# Patient Record
Sex: FEMALE | URBAN - NONMETROPOLITAN AREA
[De-identification: names, ages, dates, MRNs, and addresses within clinical notes are randomized per-mention and may not be internally consistent; named-entity substitution may affect disease eponyms.]

---

## 2024-04-08 ENCOUNTER — APPOINTMENT (OUTPATIENT)
Dept: CARDIOLOGY | Facility: CLINIC | Age: 49
End: 2024-04-08
Payer: COMMERCIAL

## 2024-04-15 ENCOUNTER — OFFICE VISIT (OUTPATIENT)
Dept: CARDIOLOGY | Facility: CLINIC | Age: 49
End: 2024-04-15
Payer: COMMERCIAL

## 2024-04-15 VITALS
DIASTOLIC BLOOD PRESSURE: 110 MMHG | WEIGHT: 137 LBS | BODY MASS INDEX: 23.39 KG/M2 | HEIGHT: 64 IN | HEART RATE: 92 BPM | SYSTOLIC BLOOD PRESSURE: 154 MMHG

## 2024-04-15 DIAGNOSIS — E87.1 HYPONATREMIA: ICD-10-CM

## 2024-04-15 DIAGNOSIS — R00.2 PALPITATIONS: ICD-10-CM

## 2024-04-15 DIAGNOSIS — E87.6 HYPOKALEMIA: ICD-10-CM

## 2024-04-15 DIAGNOSIS — Z87.891 FORMER SMOKER: ICD-10-CM

## 2024-04-15 DIAGNOSIS — I10 PRIMARY HYPERTENSION: ICD-10-CM

## 2024-04-15 DIAGNOSIS — R42 DIZZINESS: ICD-10-CM

## 2024-04-15 DIAGNOSIS — R00.0 SINUS TACHYCARDIA: ICD-10-CM

## 2024-04-15 PROCEDURE — 3074F SYST BP LT 130 MM HG: CPT | Performed by: INTERNAL MEDICINE

## 2024-04-15 PROCEDURE — 93000 ELECTROCARDIOGRAM COMPLETE: CPT | Performed by: INTERNAL MEDICINE

## 2024-04-15 PROCEDURE — 3079F DIAST BP 80-89 MM HG: CPT | Performed by: INTERNAL MEDICINE

## 2024-04-15 PROCEDURE — 1036F TOBACCO NON-USER: CPT | Performed by: INTERNAL MEDICINE

## 2024-04-15 PROCEDURE — 99204 OFFICE O/P NEW MOD 45 MIN: CPT | Performed by: INTERNAL MEDICINE

## 2024-04-15 RX ORDER — ASCORBIC ACID 125 MG
1 TABLET,CHEWABLE ORAL DAILY
COMMUNITY

## 2024-04-15 RX ORDER — OMEPRAZOLE 40 MG/1
40 CAPSULE, DELAYED RELEASE ORAL
COMMUNITY
Start: 2024-03-06

## 2024-04-15 RX ORDER — OMEGA-3-ACID ETHYL ESTERS 1 G/1
1 CAPSULE, LIQUID FILLED ORAL DAILY
COMMUNITY

## 2024-04-15 RX ORDER — METOPROLOL SUCCINATE 50 MG/1
50 TABLET, EXTENDED RELEASE ORAL DAILY
Qty: 90 TABLET | Refills: 3 | Status: SHIPPED | OUTPATIENT
Start: 2024-04-15 | End: 2024-04-26 | Stop reason: SINTOL

## 2024-04-15 RX ORDER — LANOLIN ALCOHOL/MO/W.PET/CERES
400 CREAM (GRAM) TOPICAL DAILY
Qty: 90 TABLET | Refills: 3 | Status: SHIPPED | OUTPATIENT
Start: 2024-04-15 | End: 2024-04-26 | Stop reason: SINTOL

## 2024-04-15 RX ORDER — METOPROLOL SUCCINATE 25 MG/1
25 TABLET, EXTENDED RELEASE ORAL DAILY
COMMUNITY
Start: 2024-03-11 | End: 2024-04-15 | Stop reason: DRUGHIGH

## 2024-04-15 NOTE — LETTER
April 15, 2024     Luis Escalera DO  [address]  [place] [address]    Patient: Laurel Sapp   YOB: 1975   Date of Visit: 4/15/2024       Dear Dr. Luis Escalera DO:    Thank you for referring Laurel Sapp to me for evaluation. Below are my notes for this consultation.  If you have questions, please do not hesitate to call me. I look forward to following your patient along with you.       Sincerely,     Janna Byrd MD      CC: No Recipients  ______________________________________________________________________________________    Referred by  No ref. provider found for New Patient Visit (Dizzy, palpitations)     History Of Present Illness:    Laurel Sapp is a 48 y.o. female presenting with palpitations dizziness loss of sense of balance, multiple ER visits for a variety of similar complaints, intolerance to several blood pressure medications, hyponatremia and hypokalemia.    She also complains of sharp bilateral upper chest pain.  She says she has not felt well since December 2023.  Initially she felt like she was on too many blood pressure medications.  She is currently only on the metoprolol succinate.  She works in a medical facility.  She has noticed that her heart rate has been elevated into the 100-1 20 range, frequently with any activity sometimes at rest, and on occasions it rises to 140 bpm.  She feels heavy pounding in her chest, and in her head, and at times is lightheaded.  She has also been getting headaches.  Headaches occur particularly when her blood pressure is elevated.  Her appetite has been poor.  She says she recently had strep throat, and since then appetite is poor.  She cut out coffee in December 2023.  She has not passed out.  She has a family history of hypertension.  She denies being pregnant.  Initial blood pressure was 154/110 in the office, repeat blood pressure 124 over 80s, resting heart rate 92 bpm, EKG  "was reviewed.  Blood pressure symmetric in both upper extremities.    Details pertinent pertaining to multiple ER visits was reviewed.  She was in the emergency department yesterday, at which time her sodium was 129 potassium 3.4 glucose 100.  Her lipid profile from December 2023 showed HDL cholesterol 56 LDL cholesterol 127 triglycerides 58 total cholesterol to HDL ratio 3.5.    12 point review of systems is negative or noncontributory except as noted    Past Medical History:  She has no past medical history on file.    Past Surgical History:  She has a past surgical history that includes Hysterectomy; Breast lumpectomy; and Tubal ligation.      Social History:  She reports that she has quit smoking. Her smoking use included cigarettes. She has quit using smokeless tobacco. She reports that she does not currently use alcohol. She reports that she does not use drugs.    Family History:  Family History   Problem Relation Name Age of Onset   • No Known Problems Mother     • Lung cancer Father     • Hypertension Sister     • Hypertension Maternal Grandmother     • Diabetes Maternal Grandmother     • Diabetes Paternal Grandfather          Allergies:  Amlodipine, Chlorthalidone, Amoxicillin, Ciprofloxacin, and Sulfa (sulfonamide antibiotics)    Outpatient Medications:  Current Outpatient Medications   Medication Instructions   • metoprolol succinate XL (TOPROL-XL) 25 mg, oral, Daily   • omega-3 acid ethyl esters (LOVAZA) 1 g, oral, Daily   • omeprazole (PRILOSEC) 40 mg, oral, Daily before breakfast   • red beet root-sour cherry ext 250-0.5 mg tablet,chewable 1 tablet, oral, Daily        Last Recorded Vitals:  Vitals:    04/15/24 1422 04/15/24 1423   BP: 124/86 128/88   BP Location: Left arm Right arm   Patient Position: Sitting Sitting   Pulse: 92    Weight: 62.1 kg (137 lb)    Height: 1.626 m (5' 4\")        Physical Exam:    GENERAL APPEARANCE: Well developed, well nourished, in no acute distress.  CHEST: Symmetric and " non-tender.  INTEGUMENT: Skin warm and dry, without gross excoriationis or lesions.  HEENT: No gross abnormalities, no jugular venous distention no carotid bruit or scleral icterus  NECK: Supple, no JVD, no bruit. Thyroid not palpable. Carotid upstrokes normal.  NEURO/PSHCY: Alert and oriented x3; appropriate behavior and responses and responses, with normal balance and coordination  LUNGS: Clear to auscultation bilaterally; normal respiratory effort.  HEART: Rate and rhythm regular with no evident murmur; no gallop appreciated. There are no rubs, clicks or heaves.   ABDOMEN: Soft, nontender, no masses  or bruits.   MUSCULOSKELETAL: No obvious deformity identified  EXTREMITIES: Warm  There is no edema noted.  PERIPHERAL VASCULAR: Pulses present and equally palpable; 2+ throughout.              Labs reviewed today : Labs from the emergency department to include CBC basic metabolic profile comprehensive profile reviewed.    Assessment/Plan  Diagnoses and all orders for this visit:  Palpitations  -     Follow Up In Cardiology; Future  -     ECG 12 Lead  -     Holter Or Event Cardiac Monitor; Future  -     metoprolol succinate XL (Toprol-XL) 50 mg 24 hr tablet; Take 1 tablet (50 mg) by mouth once daily. Do not crush or chew.  -     magnesium oxide (Mag-Ox) 400 mg (241.3 mg magnesium) tablet; Take 1 tablet (400 mg) by mouth once daily.  -     Transthoracic Echo Complete; Future  Dizziness  -     Holter Or Event Cardiac Monitor; Future  -     Transthoracic Echo Complete; Future  Primary hypertension  -     metoprolol succinate XL (Toprol-XL) 50 mg 24 hr tablet; Take 1 tablet (50 mg) by mouth once daily. Do not crush or chew.  -     Transthoracic Echo Complete; Future  -     Basic Metabolic Panel; Future  -     Thyroxine, Free; Future  -     Thyroid Stimulating Hormone; Future  Hyponatremia  -     Basic Metabolic Panel; Future  -     Thyroxine, Free; Future  -     Thyroid Stimulating Hormone; Future  Hypokalemia  -      Basic Metabolic Panel; Future  -     Thyroxine, Free; Future  -     Thyroid Stimulating Hormone; Future  Sinus tachycardia  -     ECG 12 Lead  -     Holter Or Event Cardiac Monitor; Future  -     Transthoracic Echo Complete; Future  -     Basic Metabolic Panel; Future  -     Thyroxine, Free; Future  -     Thyroid Stimulating Hormone; Future  BMI 23.0-23.9, adult  Former smoker      Clinical decision makin.  Discussed with patient that management of hypertension usually involves trial and error, several medications may need to be tried, she may have or may not tolerate the medications that we try, in which case we will have to switch medications, and close follow-up would be warranted  2.  At this point I would say that she has inappropriate sinus tachycardia.  3.  Her hypokalemia is difficult to explain except if she has been on diuretics.  4.  She was also hyponatremic yesterday, could have been volume depleted, or could have been related to the ACE inhibitor.  We will continue to follow  5.  Will increase the metoprolol succinate from 25 mg daily to 50 mg daily  6.  May need to further uptitrate as needed.  She wants to take it twice daily, so she will increase the dose to 25 mg p.o. twice daily  7.  Echocardiogram in view of her dizziness and her chest pain  8.  48-hour Holter monitor  9.  She is encouraged to increase intake of potassium rich foods  10.  Hydration was encouraged  11.  48-hour Holter monitor  12.  Free T4 and TSH  13.  Basic metabolic profile after 48 hours  14.  At this point there is no clinical suspicion for pulmonary embolism.    Patient to follow-up after testing sooner if interval problems arise  Provider Attestation - Scribe documentation    All medical record entries made by the Scribe were at my direction and personally dictated by me. I have reviewed the chart and agree that the record accurately reflects my personal performance of the history, physical exam, discussion and plan.

## 2024-04-15 NOTE — PROGRESS NOTES
Referred by Dr. Echeverria ref. provider found for New Patient Visit (Dizzy, palpitations)     History Of Present Illness:    Laurel Sapp is a 48 y.o. female presenting with palpitations dizziness loss of sense of balance, multiple ER visits for a variety of similar complaints, intolerance to several blood pressure medications, hyponatremia and hypokalemia.    She also complains of sharp bilateral upper chest pain.  She says she has not felt well since December 2023.  Initially she felt like she was on too many blood pressure medications.  She is currently only on the metoprolol succinate.  She works in a medical facility.  She has noticed that her heart rate has been elevated into the 100-1 20 range, frequently with any activity sometimes at rest, and on occasions it rises to 140 bpm.  She feels heavy pounding in her chest, and in her head, and at times is lightheaded.  She has also been getting headaches.  Headaches occur particularly when her blood pressure is elevated.  Her appetite has been poor.  She says she recently had strep throat, and since then appetite is poor.  She cut out coffee in December 2023.  She has not passed out.  She has a family history of hypertension.  She denies being pregnant.  Initial blood pressure was 154/110 in the office, repeat blood pressure 124 over 80s, resting heart rate 92 bpm, EKG was reviewed.  Blood pressure symmetric in both upper extremities.    Details pertinent pertaining to multiple ER visits was reviewed.  She was in the emergency department yesterday, at which time her sodium was 129 potassium 3.4 glucose 100.  Her lipid profile from December 2023 showed HDL cholesterol 56 LDL cholesterol 127 triglycerides 58 total cholesterol to HDL ratio 3.5.    12 point review of systems is negative or noncontributory except as noted    Past Medical History:  She has no past medical history on file.    Past Surgical History:  She has a past surgical history that includes  "Hysterectomy; Breast lumpectomy; and Tubal ligation.      Social History:  She reports that she has quit smoking. Her smoking use included cigarettes. She has quit using smokeless tobacco. She reports that she does not currently use alcohol. She reports that she does not use drugs.    Family History:  Family History   Problem Relation Name Age of Onset    No Known Problems Mother      Lung cancer Father      Hypertension Sister      Hypertension Maternal Grandmother      Diabetes Maternal Grandmother      Diabetes Paternal Grandfather          Allergies:  Amlodipine, Chlorthalidone, Amoxicillin, Ciprofloxacin, and Sulfa (sulfonamide antibiotics)    Outpatient Medications:  Current Outpatient Medications   Medication Instructions    metoprolol succinate XL (TOPROL-XL) 25 mg, oral, Daily    omega-3 acid ethyl esters (LOVAZA) 1 g, oral, Daily    omeprazole (PRILOSEC) 40 mg, oral, Daily before breakfast    red beet root-sour cherry ext 250-0.5 mg tablet,chewable 1 tablet, oral, Daily        Last Recorded Vitals:  Vitals:    04/15/24 1422 04/15/24 1423   BP: 124/86 128/88   BP Location: Left arm Right arm   Patient Position: Sitting Sitting   Pulse: 92    Weight: 62.1 kg (137 lb)    Height: 1.626 m (5' 4\")        Physical Exam:    GENERAL APPEARANCE: Well developed, well nourished, in no acute distress.  CHEST: Symmetric and non-tender.  INTEGUMENT: Skin warm and dry, without gross excoriationis or lesions.  HEENT: No gross abnormalities, no jugular venous distention no carotid bruit or scleral icterus  NECK: Supple, no JVD, no bruit. Thyroid not palpable. Carotid upstrokes normal.  NEURO/PSHCY: Alert and oriented x3; appropriate behavior and responses and responses, with normal balance and coordination  LUNGS: Clear to auscultation bilaterally; normal respiratory effort.  HEART: Rate and rhythm regular with no evident murmur; no gallop appreciated. There are no rubs, clicks or heaves.   ABDOMEN: Soft, nontender, no " masses  or bruits.   MUSCULOSKELETAL: No obvious deformity identified  EXTREMITIES: Warm  There is no edema noted.  PERIPHERAL VASCULAR: Pulses present and equally palpable; 2+ throughout.              Labs reviewed today : Labs from the emergency department to include CBC basic metabolic profile comprehensive profile reviewed.    Assessment/Plan   Diagnoses and all orders for this visit:  Palpitations  -     Follow Up In Cardiology; Future  -     ECG 12 Lead  -     Holter Or Event Cardiac Monitor; Future  -     metoprolol succinate XL (Toprol-XL) 50 mg 24 hr tablet; Take 1 tablet (50 mg) by mouth once daily. Do not crush or chew.  -     magnesium oxide (Mag-Ox) 400 mg (241.3 mg magnesium) tablet; Take 1 tablet (400 mg) by mouth once daily.  -     Transthoracic Echo Complete; Future  Dizziness  -     Holter Or Event Cardiac Monitor; Future  -     Transthoracic Echo Complete; Future  Primary hypertension  -     metoprolol succinate XL (Toprol-XL) 50 mg 24 hr tablet; Take 1 tablet (50 mg) by mouth once daily. Do not crush or chew.  -     Transthoracic Echo Complete; Future  -     Basic Metabolic Panel; Future  -     Thyroxine, Free; Future  -     Thyroid Stimulating Hormone; Future  Hyponatremia  -     Basic Metabolic Panel; Future  -     Thyroxine, Free; Future  -     Thyroid Stimulating Hormone; Future  Hypokalemia  -     Basic Metabolic Panel; Future  -     Thyroxine, Free; Future  -     Thyroid Stimulating Hormone; Future  Sinus tachycardia  -     ECG 12 Lead  -     Holter Or Event Cardiac Monitor; Future  -     Transthoracic Echo Complete; Future  -     Basic Metabolic Panel; Future  -     Thyroxine, Free; Future  -     Thyroid Stimulating Hormone; Future  BMI 23.0-23.9, adult  Former smoker      Clinical decision makin.  Discussed with patient that management of hypertension usually involves trial and error, several medications may need to be tried, she may have or may not tolerate the medications that we  try, in which case we will have to switch medications, and close follow-up would be warranted  2.  At this point I would say that she has inappropriate sinus tachycardia.  3.  Her hypokalemia is difficult to explain except if she has been on diuretics.  4.  She was also hyponatremic yesterday, could have been volume depleted, or could have been related to the ACE inhibitor.  We will continue to follow  5.  Will increase the metoprolol succinate from 25 mg daily to 50 mg daily  6.  May need to further uptitrate as needed.  She wants to take it twice daily, so she will increase the dose to 25 mg p.o. twice daily  7.  Echocardiogram in view of her dizziness and her chest pain  8.  48-hour Holter monitor  9.  She is encouraged to increase intake of potassium rich foods  10.  Hydration was encouraged  11.  48-hour Holter monitor  12.  Free T4 and TSH  13.  Basic metabolic profile after 48 hours  14.  At this point there is no clinical suspicion for pulmonary embolism.    Patient to follow-up after testing sooner if interval problems arise  Provider Attestation - Scribe documentation    All medical record entries made by the Scribe were at my direction and personally dictated by me. I have reviewed the chart and agree that the record accurately reflects my personal performance of the history, physical exam, discussion and plan.

## 2024-04-23 ENCOUNTER — PATIENT MESSAGE (OUTPATIENT)
Dept: CARDIOLOGY | Facility: CLINIC | Age: 49
End: 2024-04-23
Payer: COMMERCIAL

## 2024-04-23 DIAGNOSIS — R00.2 PALPITATIONS: ICD-10-CM

## 2024-04-23 DIAGNOSIS — I10 PRIMARY HYPERTENSION: ICD-10-CM

## 2024-04-26 ENCOUNTER — ANCILLARY PROCEDURE (OUTPATIENT)
Dept: CARDIOLOGY | Facility: CLINIC | Age: 49
End: 2024-04-26
Payer: COMMERCIAL

## 2024-04-26 DIAGNOSIS — R00.0 SINUS TACHYCARDIA: ICD-10-CM

## 2024-04-26 DIAGNOSIS — R00.2 PALPITATIONS: ICD-10-CM

## 2024-04-26 DIAGNOSIS — R42 DIZZINESS: ICD-10-CM

## 2024-04-26 PROCEDURE — 93272 ECG/REVIEW INTERPRET ONLY: CPT | Performed by: INTERNAL MEDICINE

## 2024-04-26 RX ORDER — METOPROLOL SUCCINATE 25 MG/1
25 TABLET, EXTENDED RELEASE ORAL 2 TIMES DAILY
COMMUNITY
End: 2024-06-10 | Stop reason: SDUPTHER

## 2024-04-26 NOTE — PATIENT COMMUNICATION
"Patient was in office for KOH application report she is taking metoprolol succinate 25mg BID instead to the 50mg at one time. Patient reports does not take consistently, makes her swell and hands itching, Patient reports possible allergy reaction.     Patient is not using magnesium oxide, states made her feel unwell \"weird\"    Patient states she was increasing sodium intake d/t sodium level being low per the patient trying to avoid returning to the hospital.   Reviewed labs in chart from 03/2024, sodium was 136  Potassium 4.0    "

## 2024-05-20 ENCOUNTER — HOSPITAL ENCOUNTER (OUTPATIENT)
Dept: CARDIOLOGY | Facility: CLINIC | Age: 49
Discharge: HOME | End: 2024-05-20
Payer: COMMERCIAL

## 2024-05-20 VITALS — WEIGHT: 137 LBS | HEIGHT: 64 IN | BODY MASS INDEX: 23.39 KG/M2

## 2024-05-20 DIAGNOSIS — R42 DIZZINESS: ICD-10-CM

## 2024-05-20 DIAGNOSIS — R00.2 PALPITATIONS: ICD-10-CM

## 2024-05-20 DIAGNOSIS — R00.0 SINUS TACHYCARDIA: ICD-10-CM

## 2024-05-20 DIAGNOSIS — I10 PRIMARY HYPERTENSION: ICD-10-CM

## 2024-05-20 PROCEDURE — 93306 TTE W/DOPPLER COMPLETE: CPT

## 2024-05-20 PROCEDURE — 93306 TTE W/DOPPLER COMPLETE: CPT | Performed by: INTERNAL MEDICINE

## 2024-05-20 NOTE — PATIENT COMMUNICATION
"Patient in office for her ECHO. Patient has continued complaints of bilateral lower extremity edema. Patient states she has not been increasing her sodium intake, Patient states she wears arjun hose currently, when she does not wear them her legs throb when she walks due to the swelling \"My legs are not normally this size, I can tell the difference\". Patient labs review per Rea Merrill NP while in office and are WNL. Patient advised to continue wearing her KOH. Advised will send complaints to Dr. Janna Byrd MD fore review and see if possible med change needed.   "

## 2024-05-21 ENCOUNTER — OFFICE VISIT (OUTPATIENT)
Age: 49
End: 2024-05-21
Payer: COMMERCIAL

## 2024-05-21 VITALS
SYSTOLIC BLOOD PRESSURE: 127 MMHG | BODY MASS INDEX: 23.56 KG/M2 | HEIGHT: 64 IN | OXYGEN SATURATION: 99 % | HEART RATE: 84 BPM | DIASTOLIC BLOOD PRESSURE: 91 MMHG | WEIGHT: 138 LBS | RESPIRATION RATE: 18 BRPM

## 2024-05-21 DIAGNOSIS — R09.A2 GLOBUS SENSATION: ICD-10-CM

## 2024-05-21 DIAGNOSIS — R13.10 DYSPHAGIA, UNSPECIFIED TYPE: Primary | ICD-10-CM

## 2024-05-21 LAB
AORTIC VALVE MEAN GRADIENT: 2 MMHG
AORTIC VALVE PEAK VELOCITY: 1.21 M/S
AV PEAK GRADIENT: 5.9 MMHG
AVA (PEAK VEL): 2.15 CM2
AVA (VTI): 2.52 CM2
EJECTION FRACTION APICAL 4 CHAMBER: 64.1
LEFT VENTRICLE INTERNAL DIMENSION DIASTOLE: 4.52 CM (ref 3.5–6)
LEFT VENTRICULAR OUTFLOW TRACT DIAMETER: 1.9 CM
MITRAL VALVE E/A RATIO: 1.69
MITRAL VALVE E/E' RATIO: 6.4

## 2024-05-21 PROCEDURE — 99204 OFFICE O/P NEW MOD 45 MIN: CPT | Performed by: STUDENT IN AN ORGANIZED HEALTH CARE EDUCATION/TRAINING PROGRAM

## 2024-05-21 RX ORDER — OMEPRAZOLE 40 MG/1
40 CAPSULE, DELAYED RELEASE ORAL
COMMUNITY
Start: 2024-03-06 | End: 2024-05-21 | Stop reason: SINTOL

## 2024-05-21 RX ORDER — OMEPRAZOLE 20 MG/1
40 CAPSULE, DELAYED RELEASE ORAL
Qty: 60 CAPSULE | Refills: 0 | Status: SHIPPED | OUTPATIENT
Start: 2024-05-21 | End: 2024-06-20

## 2024-05-21 RX ORDER — METOPROLOL SUCCINATE 25 MG/1
25 TABLET, EXTENDED RELEASE ORAL 2 TIMES DAILY
COMMUNITY
Start: 2024-04-17 | End: 2025-04-17

## 2024-05-21 RX ORDER — METHYLPREDNISOLONE 4 MG/1
TABLET ORAL
Qty: 1 KIT | Refills: 0 | Status: SHIPPED | OUTPATIENT
Start: 2024-05-21 | End: 2024-05-27

## 2024-05-21 RX ORDER — CETIRIZINE HYDROCHLORIDE 10 MG/1
10 TABLET ORAL DAILY
COMMUNITY
Start: 2024-05-15 | End: 2025-05-15

## 2024-05-21 NOTE — PROGRESS NOTES
rhinorrhea.      Mouth/Throat:      Lips: Pink.      Mouth: Mucous membranes are moist. No oral lesions.      Tongue: No lesions.      Palate: No mass and lesions.      Pharynx: Oropharynx is clear. Uvula midline.      Tonsils: No tonsillar exudate or tonsillar abscesses.   Eyes:      Extraocular Movements: Extraocular movements intact.      Conjunctiva/sclera: Conjunctivae normal.   Pulmonary:      Effort: Pulmonary effort is normal.      Breath sounds: No stridor.   Musculoskeletal:         General: No signs of injury.      Cervical back: Tenderness (right level II) present.   Lymphadenopathy:      Cervical: No cervical adenopathy.   Skin:     General: Skin is warm and dry.   Neurological:      General: No focal deficit present.      Mental Status: She is alert and oriented to person, place, and time.      Cranial Nerves: No cranial nerve deficit.      Motor: No weakness.   Psychiatric:         Mood and Affect: Mood and affect normal.         Behavior: Behavior normal.           Side effects, adverse effects of the medication prescribed today, as well as treatment plan/ rationale and result expectations have been discussed with the patient who expresses understanding and desires to proceed.  Close follow up to evaluate treatment results and for coordination of care.    I have reviewed the patient's medical history in detail and updated the computerized patient record.      Nithya Beck MD

## 2024-05-30 ENCOUNTER — PATIENT MESSAGE (OUTPATIENT)
Dept: CARDIOLOGY | Facility: CLINIC | Age: 49
End: 2024-05-30
Payer: COMMERCIAL

## 2024-05-31 ENCOUNTER — TELEPHONE (OUTPATIENT)
Dept: CARDIOLOGY | Facility: CLINIC | Age: 49
End: 2024-05-31
Payer: COMMERCIAL

## 2024-06-03 NOTE — PATIENT COMMUNICATION
Phoned patient to discuss edema and BP. Patient states she can not start Aldactone or any other water pills. Will wait to discuss other options at [facility] 6/19/2024 with Rea Merrill NP.

## 2024-06-07 ENCOUNTER — TELEPHONE (OUTPATIENT)
Dept: CARDIOLOGY | Facility: CLINIC | Age: 49
End: 2024-06-07
Payer: COMMERCIAL

## 2024-06-07 NOTE — TELEPHONE ENCOUNTER
Result Communication    Resulted Orders   Holter Or Event Cardiac Monitor    Narrative    This is a 30-day event monitor    Indication: Palpitation and tachycardia    Procedure patient underwent 30-day event monitor.  The baseline rhythm is   normal sinus rhythm.  During the monitoring.  The patient activated the   alarm button on 12 occasion the rhythm during which was sinus on several   occasion, mild sinus tachycardia on 2 occasion, mild sinus bradycardia on   2 occasion and on 1 occasion with 1 PAC    Conclusion    1.  Baseline rhythm is normal sinus rhythm  2.  Symptoms of palpitation shortness of breath did not correlate well   with any specific rhythm disturbance and occurred on occasion during sinus   rhythm on other mild sinus tachycardia or mild sinus bradycardia  3.  No tacky or bradycardia arrhythmia with documented         2:49 PM

## 2024-06-07 NOTE — TELEPHONE ENCOUNTER
----- Message from LALITHA Krishnan-CNP sent at 6/6/2024 11:14 AM EDT -----  KOH: NSR  Her symptoms did not correlate with any arrhythmia  We can discuss in detail at follow up   ----- Message -----  From: Harleen Hines MD  Sent: 5/30/2024   4:45 PM EDT  To: Janna Byrd MD

## 2024-06-10 ENCOUNTER — APPOINTMENT (OUTPATIENT)
Dept: CARDIOLOGY | Facility: CLINIC | Age: 49
End: 2024-06-10
Payer: COMMERCIAL

## 2024-06-10 ENCOUNTER — TELEPHONE (OUTPATIENT)
Dept: CARDIOLOGY | Facility: CLINIC | Age: 49
End: 2024-06-10

## 2024-06-10 DIAGNOSIS — I10 PRIMARY HYPERTENSION: ICD-10-CM

## 2024-06-10 RX ORDER — METOPROLOL SUCCINATE 25 MG/1
25 TABLET, EXTENDED RELEASE ORAL 2 TIMES DAILY
Qty: 180 TABLET | Refills: 3 | Status: SHIPPED | OUTPATIENT
Start: 2024-06-10 | End: 2025-06-10

## 2024-06-10 RX ORDER — LOSARTAN POTASSIUM 25 MG/1
25 TABLET ORAL DAILY
Qty: 90 TABLET | Refills: 3 | Status: SHIPPED | OUTPATIENT
Start: 2024-06-10 | End: 2025-06-10

## 2024-06-10 NOTE — TELEPHONE ENCOUNTER
----- Message from Mal Burr MD sent at 6/10/2024  4:16 PM EDT -----  Regarding: FW: Bp  Contact: [phone]  Stay on metoprolol as ordered.  Add losartan 25 mg a day.  No lab.  ----- Message -----  From: Philippe Goldman MA  Sent: 6/10/2024   3:24 PM EDT  To: Mal Burr MD  Subject: FW: Bp                                           Patient sees Dr. Byrd who is still out of office, and Rea Ramos NP is also out of office this week. Please advise in absence, thank you   ----- Message -----  From: Laurel Sapp  Sent: 6/10/2024   2:49 PM EDT  To: [facility] Clinical Support Staff  Subject: Bp                                               Hello I called earlier this morning and left a message on voice mail for some one to call me back in regards to my bp medicine. My blood pressure is still high and it has been two weeks now. My head is pounding. If someone could call me ASAP I’d appreciate it. I can not go another week like this.

## 2024-06-10 NOTE — TELEPHONE ENCOUNTER
In Dr. Janna Byrd MD absence     Patient was in [facility] 2 weeks ago, records requested.  Patient has been having increased BP readings  145/93 and is asking about increasing her BP meds from 25 mg to 75 mg daily?     Please advise

## 2024-06-19 ENCOUNTER — APPOINTMENT (OUTPATIENT)
Dept: CARDIOLOGY | Facility: CLINIC | Age: 49
End: 2024-06-19
Payer: COMMERCIAL

## 2024-06-19 VITALS
DIASTOLIC BLOOD PRESSURE: 110 MMHG | BODY MASS INDEX: 23.39 KG/M2 | HEIGHT: 64 IN | SYSTOLIC BLOOD PRESSURE: 178 MMHG | HEART RATE: 79 BPM | WEIGHT: 137 LBS

## 2024-06-19 DIAGNOSIS — I10 PRIMARY HYPERTENSION: Primary | ICD-10-CM

## 2024-06-19 DIAGNOSIS — R00.2 PALPITATIONS: ICD-10-CM

## 2024-06-19 PROCEDURE — 3077F SYST BP >= 140 MM HG: CPT | Performed by: NURSE PRACTITIONER

## 2024-06-19 PROCEDURE — 3080F DIAST BP >= 90 MM HG: CPT | Performed by: NURSE PRACTITIONER

## 2024-06-19 PROCEDURE — 3008F BODY MASS INDEX DOCD: CPT | Performed by: NURSE PRACTITIONER

## 2024-06-19 PROCEDURE — 99213 OFFICE O/P EST LOW 20 MIN: CPT | Performed by: NURSE PRACTITIONER

## 2024-06-19 RX ORDER — LISINOPRIL 40 MG/1
40 TABLET ORAL DAILY
Qty: 30 TABLET | Refills: 11 | Status: SHIPPED | OUTPATIENT
Start: 2024-06-19 | End: 2025-06-19

## 2024-06-19 RX ORDER — ERGOCALCIFEROL 1.25 MG/1
1 CAPSULE ORAL
COMMUNITY
Start: 2024-06-17

## 2024-06-19 NOTE — PATIENT INSTRUCTIONS
Please bring all medicines, vitamins, and herbal supplements with you when you come to the office.    Prescriptions will not be filled unless you are compliant with your follow up appointments or have a follow up appointment scheduled as per instruction of your physician. Refills should be requested at the time of your visit.     PLAN:   Through informed decision making process incorporating patients unique circumstances, the following treatment plan will be initiated:    1.  Prescription drug management of cardiovascular medication for efficacy, adherence to treatment, side effect assessment and polypharmacy. Current treatment clinically warranted and to continue with following modifications:    -  Stop cozaar 25mg daily  -  Begin lisinopril 40mg daily    2. Labs (chem6) in one week    3. Return for follow-up; in the interim, contact the office if new symptoms arise.  NP one month

## 2024-06-19 NOTE — LETTER
"June 20, 2024     Luis Escalera DO  [address]  [facility] [address]    Patient: Laurel Sapp   YOB: 1975   Date of Visit: 6/19/2024       Dear Dr. Luis Escalera DO:    Thank you for referring Laurel Sapp to me for evaluation. Below are my notes for this consultation.  If you have questions, please do not hesitate to call me. I look forward to following your patient along with you.       Sincerely,     Rea Ramos, APRN-CNP      CC: No Recipients  ______________________________________________________________________________________    Subjective:   Laurel Sapp is a 48 y.o. female with hypertension.  Presents to the office today ambulatory with steady gait.  Last evaluated in clinic Dr. Byrd April 2024 as a new consult due to palpitations and accelerated hypertension.  Her dose of metoprolol was increased.  Subsequent testing has included:  Echocardiogram with LVEF 60 to 65%, structurally normal heart.  [facility] maintaining normal sinus rhythm, symptoms with no correlation to arrhythmia.    There have been multiple phone calls into the office regarding elevated blood pressure, increase edema in her legs.  She reports today that she was evaluated by rheumatology and was told her vitamin D was low, after 1 dose of vitamin D supplement the swelling is improved and her legs are no longer aching.    She was seen in the local emergency department May 30, 2024 with a blood pressure 168/92.  Labs at that time sodium 139, potassium 3.7, creatinine 0.95.    Previously she was noted to be hyponatremic and hypokalemic -upon questioning she is fairly certain she was taking a water pill at that time.    Reports going to the urgent care last week due to leg swelling, was provided with Lasix but she only took 1.  Reports being\" afraid to use\" diuretics.    She reports she has longstanding hypertension since 2009.  Had been doing well on lisinopril " "20 mg daily until earlier this year.  She reports she had multiple medication changes including Norvasc, diuretics and increased beta-blocker but was unable to tolerate.  Earlier this week Dr. Schultz initiated Cozaar 25 mg daily and she reports\" it is not doing anything\".    After lengthy discussion regarding medication intolerances and continued difficulty with hypertension.  Will plan to resume lisinopril as we know she could tolerate it in the past but will increase to 40 mg daily.  In the interim, she will continue on Toprol twice daily dosing.    Her complaints of leg discomfort and edema have resolved with treatment with vitamin D.    Current Outpatient Medications   Medication Sig Dispense Refill   • ergocalciferol (Vitamin D-2) 1.25 MG (43842 UT) capsule Take 1 capsule (1,250 mcg) by mouth every 7 days.     • metoprolol succinate XL (Toprol-XL) 25 mg 24 hr tablet Take 1 tablet (25 mg) by mouth 2 times a day. Do not crush or chew. 180 tablet 3   • omega-3 acid ethyl esters (Lovaza) 1 gram capsule Take 1 capsule (1 g) by mouth once daily.     • omeprazole (PriLOSEC) 40 mg DR capsule Take 1 capsule (40 mg) by mouth once daily in the morning. Take before meals.     • red beet root-sour cherry ext 250-0.5 mg tablet,chewable Chew 1 tablet once daily.     • lisinopril 40 mg tablet Take 1 tablet (40 mg) by mouth once daily. 30 tablet 11     No current facility-administered medications for this visit.      Hypertension ROS: taking medications as instructed, no medication side effects noted, no TIA's, no chest pain on exertion, no dyspnea on exertion, and no swelling of ankles.   New concerns: none.     Objective:   BP (!) 178/110 (BP Location: Left arm, Patient Position: Sitting)   Pulse 79   Ht 1.626 m (5' 4\")   Wt 62.1 kg (137 lb)   BMI 23.52 kg/m²    Appearance alert, well appearing, and in no distress.  General exam BP noted to be moderately elevated today in office, S1, S2 normal, no gallop, no murmur, " chest clear, no JVD, no HSM, no edema.   Lab review: labs are reviewed, up to date and normal.     Assessment:    Hypertension poorly controlled and needs improvement.     Plan:     Through informed decision making process incorporating patients unique circumstances, the following treatment plan will be initiated:    1.  Prescription drug management of cardiovascular medication for efficacy, adherence to treatment, side effect assessment and polypharmacy. Current treatment clinically warranted and to continue with following modifications:    -  Stop cozaar 25mg daily  -  Begin lisinopril 40mg daily    2. Labs (chem6) in one week    3. Return for follow-up; in the interim, contact the office if new symptoms arise.  NP one month    Rea Ramos  MSN, APRN-CNP, PMHNP-BC  [facility]    Please excuse any errors in grammar or translation related to this dictation. Voice recognition software was utilized to prepare this document..

## 2024-06-20 NOTE — PROGRESS NOTES
"Subjective:   Laurel Sapp is a 48 y.o. female with hypertension.  Presents to the office today ambulatory with steady gait.  Last evaluated in clinic Dr. Byrd April 2024 as a new consult due to palpitations and accelerated hypertension.  Her dose of metoprolol was increased.  Subsequent testing has included:  Echocardiogram with LVEF 60 to 65%, structurally normal heart.  [facility] maintaining normal sinus rhythm, symptoms with no correlation to arrhythmia.    There have been multiple phone calls into the office regarding elevated blood pressure, increase edema in her legs.  She reports today that she was evaluated by rheumatology and was told her vitamin D was low, after 1 dose of vitamin D supplement the swelling is improved and her legs are no longer aching.    She was seen in the local emergency department May 30, 2024 with a blood pressure 168/92.  Labs at that time sodium 139, potassium 3.7, creatinine 0.95.    Previously she was noted to be hyponatremic and hypokalemic -upon questioning she is fairly certain she was taking a water pill at that time.    Reports going to the urgent care last week due to leg swelling, was provided with Lasix but she only took 1.  Reports being\" afraid to use\" diuretics.    She reports she has longstanding hypertension since 2009.  Had been doing well on lisinopril 20 mg daily until earlier this year.  She reports she had multiple medication changes including Norvasc, diuretics and increased beta-blocker but was unable to tolerate.  Earlier this week Dr. Schultz initiated Cozaar 25 mg daily and she reports\" it is not doing anything\".    After lengthy discussion regarding medication intolerances and continued difficulty with hypertension.  Will plan to resume lisinopril as we know she could tolerate it in the past but will increase to 40 mg daily.  In the interim, she will continue on Toprol twice daily dosing.    Her complaints of leg discomfort and edema have " "resolved with treatment with vitamin D.    Current Outpatient Medications   Medication Sig Dispense Refill    ergocalciferol (Vitamin D-2) 1.25 MG (86643 UT) capsule Take 1 capsule (1,250 mcg) by mouth every 7 days.      metoprolol succinate XL (Toprol-XL) 25 mg 24 hr tablet Take 1 tablet (25 mg) by mouth 2 times a day. Do not crush or chew. 180 tablet 3    omega-3 acid ethyl esters (Lovaza) 1 gram capsule Take 1 capsule (1 g) by mouth once daily.      omeprazole (PriLOSEC) 40 mg DR capsule Take 1 capsule (40 mg) by mouth once daily in the morning. Take before meals.      red beet root-sour cherry ext 250-0.5 mg tablet,chewable Chew 1 tablet once daily.      lisinopril 40 mg tablet Take 1 tablet (40 mg) by mouth once daily. 30 tablet 11     No current facility-administered medications for this visit.      Hypertension ROS: taking medications as instructed, no medication side effects noted, no TIA's, no chest pain on exertion, no dyspnea on exertion, and no swelling of ankles.   New concerns: none.     Objective:   BP (!) 178/110 (BP Location: Left arm, Patient Position: Sitting)   Pulse 79   Ht 1.626 m (5' 4\")   Wt 62.1 kg (137 lb)   BMI 23.52 kg/m²    Appearance alert, well appearing, and in no distress.  General exam BP noted to be moderately elevated today in office, S1, S2 normal, no gallop, no murmur, chest clear, no JVD, no HSM, no edema.   Lab review: labs are reviewed, up to date and normal.     Assessment:    Hypertension poorly controlled and needs improvement.     Plan:     Through informed decision making process incorporating patients unique circumstances, the following treatment plan will be initiated:    1.  Prescription drug management of cardiovascular medication for efficacy, adherence to treatment, side effect assessment and polypharmacy. Current treatment clinically warranted and to continue with following modifications:    -  Stop cozaar 25mg daily  -  Begin lisinopril 40mg daily    2. Labs " (chem6) in one week    3. Return for follow-up; in the interim, contact the office if new symptoms arise.  NP one month    Rea Ramos  MSN, APRN-CNP, PMHNP-BC  [facility]    Please excuse any errors in grammar or translation related to this dictation. Voice recognition software was utilized to prepare this document..

## 2024-06-21 ENCOUNTER — OFFICE VISIT (OUTPATIENT)
Age: 49
End: 2024-06-21

## 2024-06-21 VITALS
TEMPERATURE: 97.5 F | HEART RATE: 60 BPM | OXYGEN SATURATION: 99 % | HEIGHT: 64 IN | DIASTOLIC BLOOD PRESSURE: 96 MMHG | WEIGHT: 139.7 LBS | SYSTOLIC BLOOD PRESSURE: 150 MMHG | BODY MASS INDEX: 23.85 KG/M2 | RESPIRATION RATE: 18 BRPM

## 2024-06-21 DIAGNOSIS — R13.10 DYSPHAGIA, UNSPECIFIED TYPE: Primary | ICD-10-CM

## 2024-06-21 RX ORDER — ERGOCALCIFEROL 1.25 MG/1
50000 CAPSULE ORAL WEEKLY
COMMUNITY
Start: 2024-06-17

## 2024-06-21 RX ORDER — FLUTICASONE PROPIONATE 50 MCG
SPRAY, SUSPENSION (ML) NASAL
COMMUNITY
Start: 2024-05-30

## 2024-06-21 RX ORDER — LISINOPRIL 40 MG/1
40 TABLET ORAL DAILY
COMMUNITY
Start: 2024-06-19 | End: 2025-06-19

## 2024-06-21 RX ORDER — FUROSEMIDE 20 MG/1
TABLET ORAL
COMMUNITY

## 2024-06-21 RX ORDER — OMEGA-3-ACID ETHYL ESTERS 1 G/1
1 CAPSULE, LIQUID FILLED ORAL
COMMUNITY

## 2024-06-21 NOTE — PROGRESS NOTES
mg      Objective     Vitals:    06/21/24 0818 06/21/24 0828   BP: (!) 156/92 (!) 150/96   Site: Right Upper Arm Left Upper Arm   Position: Sitting Sitting   Cuff Size: Medium Adult Medium Adult   Pulse: 60    Resp: 18    Temp: 97.5 °F (36.4 °C)    TempSrc: Infrared    SpO2: 99%    Weight: 63.4 kg (139 lb 11.2 oz)    Height: 1.626 m (5' 4\")         Physical Exam  Constitutional:       Appearance: Normal appearance.   HENT:      Head: Normocephalic and atraumatic.      Right Ear: External ear normal.      Left Ear: External ear normal.      Nose: Nose normal. No nasal deformity.      Mouth/Throat:      Mouth: Mucous membranes are moist.      Tongue: No lesions.      Pharynx: Oropharynx is clear. Uvula midline.      Tonsils: No tonsillar exudate.   Eyes:      Extraocular Movements: Extraocular movements intact.   Pulmonary:      Effort: Pulmonary effort is normal.      Breath sounds: No stridor.   Lymphadenopathy:      Cervical: No cervical adenopathy.   Neurological:      General: No focal deficit present.      Mental Status: She is alert and oriented to person, place, and time.   Psychiatric:         Mood and Affect: Mood normal.         Behavior: Behavior normal.       Procedure : Flexible Nasolaryngoscopy     Verbal consent was obtained from the patient. Afrin and 4% topical lidocaine was applied to bilateral nares. After sufficient time for anesthesia, a flexible fiberoptic nasolaryngoscope was inserted into the patient's right naris. The scope was advanced through the nasal cavity fully visualizing the nasal cavity, nasopharynx, oropharynx, larynx, and hypopharynx. All structures were visualized and normal with significant findings as detailed below:     Vocal cords mobile bilaterally  Airway patent  No masses or lesions      Side effects, adverse effects of the medication prescribed today, as well as treatment plan/ rationale and result expectations have been discussed with the patient who expresses

## 2024-06-27 ENCOUNTER — TELEPHONE (OUTPATIENT)
Dept: CARDIOLOGY | Facility: CLINIC | Age: 49
End: 2024-06-27
Payer: COMMERCIAL

## 2024-06-27 DIAGNOSIS — I10 PRIMARY HYPERTENSION: ICD-10-CM

## 2024-06-27 RX ORDER — SPIRONOLACTONE 25 MG/1
12.5 TABLET ORAL DAILY
Qty: 45 TABLET | Refills: 3 | Status: SHIPPED | OUTPATIENT
Start: 2024-06-27 | End: 2025-06-27

## 2024-07-08 DIAGNOSIS — I10 PRIMARY HYPERTENSION: ICD-10-CM

## 2024-07-08 NOTE — TELEPHONE ENCOUNTER
----- Message from Laurel Sapp sent at 7/8/2024  7:31 AM EDT -----  Regarding: Bp Medication  Contact: [phone]  Hello I sending this message to see if you could  call in a refill for the lisiniprol 40 mg. When I first started taking the medication for 5 days I was actually taking 2 through out the day to get my Bp down. Before someone from your office reached out and told me to only take one. So to make a long story short I’m going to be 5 pills short before my next refill. Could you please call in another 30 day refill so I don’t run out by the weekend? I use [facility]. Thank you so much

## 2024-07-10 RX ORDER — LISINOPRIL 40 MG/1
40 TABLET ORAL DAILY
Qty: 30 TABLET | Refills: 11 | Status: SHIPPED | OUTPATIENT
Start: 2024-07-10 | End: 2025-07-10

## 2024-07-12 ENCOUNTER — HOSPITAL ENCOUNTER (OUTPATIENT)
Dept: GENERAL RADIOLOGY | Age: 49
End: 2024-07-12
Attending: STUDENT IN AN ORGANIZED HEALTH CARE EDUCATION/TRAINING PROGRAM
Payer: COMMERCIAL

## 2024-07-12 ENCOUNTER — HOSPITAL ENCOUNTER (OUTPATIENT)
Dept: ULTRASOUND IMAGING | Age: 49
Discharge: HOME | End: 2024-07-12
Attending: STUDENT IN AN ORGANIZED HEALTH CARE EDUCATION/TRAINING PROGRAM
Payer: COMMERCIAL

## 2024-07-12 DIAGNOSIS — R13.10 DYSPHAGIA, UNSPECIFIED TYPE: ICD-10-CM

## 2024-07-12 PROCEDURE — 74220 X-RAY XM ESOPHAGUS 1CNTRST: CPT

## 2024-07-12 PROCEDURE — 6370000000 HC RX 637 (ALT 250 FOR IP): Performed by: STUDENT IN AN ORGANIZED HEALTH CARE EDUCATION/TRAINING PROGRAM

## 2024-07-12 PROCEDURE — 2500000003 HC RX 250 WO HCPCS: Performed by: STUDENT IN AN ORGANIZED HEALTH CARE EDUCATION/TRAINING PROGRAM

## 2024-07-12 PROCEDURE — 76536 US EXAM OF HEAD AND NECK: CPT

## 2024-07-12 RX ADMIN — BARIUM SULFATE 176 G: 960 POWDER, FOR SUSPENSION ORAL at 09:05

## 2024-07-12 RX ADMIN — BARIUM SULFATE 340 G: 980 POWDER, FOR SUSPENSION ORAL at 09:05

## 2024-07-12 RX ADMIN — ANTACID/ANTIFLATULENT 1 EACH: 380; 550; 10; 10 GRANULE, EFFERVESCENT ORAL at 09:04

## 2024-07-15 ENCOUNTER — TELEPHONE (OUTPATIENT)
Age: 49
End: 2024-07-15

## 2024-07-15 DIAGNOSIS — R13.10 DYSPHAGIA, UNSPECIFIED TYPE: Primary | ICD-10-CM

## 2024-07-15 NOTE — TELEPHONE ENCOUNTER
Returned pt call.   Discussed US results    US Result (most recent):  US HEAD NECK SOFT TISSUE THYROID 07/12/2024    Narrative  EXAMINATION:  THYROID ULTRASOUND    7/12/2024    COMPARISON:  None.    HISTORY:  ORDERING SYSTEM PROVIDED HISTORY: Dysphagia, unspecified type  TECHNOLOGIST PROVIDED HISTORY:    Reason for exam:->right neck fullness and tenderness, dysphagia  What reading provider will be dictating this exam?->CRC    FINDINGS:  Right thyroid lobe: 6.0 x 2.0 x 1.6 cm    Left thyroid lobe: 5.4 x 1.5 x 1.8cm    Isthmus: 0.4cm    Echotexture: Homogeneous thyroid parenchyma.  Thyroid is mildly enlarged    Vascularity: Slightly increased vascularity.    Thyroid Nodules:    Multiple small subcentimeter nodules which are predominantly colloid cysts  (TR 1).    Soft tissues: No visualized lymphadenopathy    Impression  1.  Homogeneous thyroid parenchyma.  The thyroid does appear mildly enlarged.  Slightly increased vascularity.    2.  No suspicious thyroid nodules.  Currently there are no nodules seen the  meet criteria to recommend FNA nor follow-up based on the guidelines below.    ACR TI-RADS recommendations:    TR5 (>= 7 points):  FNA if >= 1 cm; follow-up if 0.5-0.9 cm in 1, 2, 3, 4,  and 5 years    TR4 (4-6 points):  FNA if >= 1.5 cm; follow-up if 1.0-1.4 cm in 1, 2, 3, and  5 years    TR3 (3 points):  FNA if >= 2.5 cm; follow-up if 1.5-2.4 cm in 1, 3, and 5  years    TR2 (2 points):  No FNA or follow-up    TR1 (0 points):  No FNA or follow-up    ACR TI-RADS recommends that no more than two nodules with the highest ACR  TI-RADS point total should be biopsied and no more than four nodules should  be followed.    RECOMMENDATIONS:  Please correlate with patient's thyroid function test.    Discussed that overall thyroid size was slightly enlarged and may be contributing to feeling of globus sensation or dysphagia although the symptoms can be multifactorial.  Discussed thyroid cyst do not require follow-up.  Told

## 2024-07-25 ENCOUNTER — APPOINTMENT (OUTPATIENT)
Dept: CARDIOLOGY | Facility: CLINIC | Age: 49
End: 2024-07-25
Payer: COMMERCIAL

## 2024-07-25 VITALS
DIASTOLIC BLOOD PRESSURE: 96 MMHG | SYSTOLIC BLOOD PRESSURE: 152 MMHG | HEIGHT: 64 IN | BODY MASS INDEX: 23.73 KG/M2 | WEIGHT: 139 LBS | HEART RATE: 72 BPM

## 2024-07-25 DIAGNOSIS — I10 PRIMARY HYPERTENSION: ICD-10-CM

## 2024-07-25 PROCEDURE — 3008F BODY MASS INDEX DOCD: CPT | Performed by: NURSE PRACTITIONER

## 2024-07-25 PROCEDURE — 1036F TOBACCO NON-USER: CPT | Performed by: NURSE PRACTITIONER

## 2024-07-25 PROCEDURE — 99212 OFFICE O/P EST SF 10 MIN: CPT | Performed by: NURSE PRACTITIONER

## 2024-07-25 PROCEDURE — 3080F DIAST BP >= 90 MM HG: CPT | Performed by: NURSE PRACTITIONER

## 2024-07-25 PROCEDURE — 3077F SYST BP >= 140 MM HG: CPT | Performed by: NURSE PRACTITIONER

## 2024-07-25 RX ORDER — SPIRONOLACTONE 25 MG/1
25 TABLET ORAL DAILY
Qty: 30 TABLET | Refills: 11 | Status: SHIPPED | OUTPATIENT
Start: 2024-07-25 | End: 2025-07-25

## 2024-07-25 NOTE — PATIENT INSTRUCTIONS
Please bring all medicines, vitamins, and herbal supplements with you when you come to the office.    Prescriptions will not be filled unless you are compliant with your follow up appointments or have a follow up appointment scheduled as per instruction of your physician. Refills should be requested at the time of your visit.     PLAN:   Through informed decision making process incorporating patients unique circumstances, the following treatment plan will be initiated:     For now take spironolactone 1/2 tablet in the morning with your lisinopril.   If your blood pressure is not at goal the increase spironolactone 25mg daily   Labs (chem6) in 2 weeks   Return for follow-up; in the interim, contact the office if new symptoms arise.  Dr. Byrd 4 weeks   ;

## 2024-07-25 NOTE — PROGRESS NOTES
Subjective:   Laurel Sapp is a 48 y.o. female with hypertension.  Patient presents to the office today ambulatory with steady gait.  Last evaluated in clinic by myself 4 weeks ago.  At that time discontinue Cozaar due to lack of efficacy and resumed lisinopril 40 mg daily.  She called into the office with suboptimal blood pressure control and was initiated on spironolactone 12.5 mg daily.  Repeat potassium 4.1, sodium 142 and creatinine 0.79.    Patient presents today where she reports initially spironolactone was efficacious with blood pressures running in the 110s over 70s.  She has noted since Saturday her blood pressure has been elevated with systolics in the upper 140s and diastolic in the low 100s.  She is unable to elicit any type of inciting event.  Denies any over-the-counter medications, no alcohol, no increased caffeine, monitors her sodium.  She does have slight ear pain and perhaps early signs of sinus infection.  Otherwise blood pressure is elevated in the office today.  She is somewhat teary-eyed.    She is currently taking her lisinopril and Toprol at 7 AM.  Second dose of Toprol around 3 PM  Taking spironolactone at bedtime.    After discussion she has been encouraged to take spironolactone 12.5 mg in the morning along with her lisinopril.  She will continue to follow her blood pressure over the course of the week.  If it does not remain optimal then she will increase her spironolactone 25 mg daily with repeat labs in 2 weeks.    Current Outpatient Medications   Medication Sig Dispense Refill    ergocalciferol (Vitamin D-2) 1.25 MG (13026 UT) capsule Take 1 capsule (1,250 mcg) by mouth every 7 days.      lisinopril 40 mg tablet Take 1 tablet (40 mg) by mouth once daily. 30 tablet 11    metoprolol succinate XL (Toprol-XL) 25 mg 24 hr tablet Take 1 tablet (25 mg) by mouth 2 times a day. Do not crush or chew. 180 tablet 3    omega-3 acid ethyl esters (Lovaza) 1 gram capsule Take 1 capsule  "(1 g) by mouth once daily.      omeprazole (PriLOSEC) 40 mg DR capsule Take 1 capsule (40 mg) by mouth once daily in the morning. Take before meals.      spironolactone (Aldactone) 25 mg tablet Take 1 tablet (25 mg) by mouth once daily. 30 tablet 11     No current facility-administered medications for this visit.      Hypertension ROS: taking medications as instructed, no medication side effects noted, no TIA's, no chest pain on exertion, no dyspnea on exertion, and no swelling of ankles.   New concerns: Blood pressure remains slightly elevated, some noted improvement with spironolactone..     Objective:   BP (!) 152/96 (BP Location: Left arm, Patient Position: Sitting)   Pulse 72   Ht 1.626 m (5' 4\")   Wt 63 kg (139 lb)   BMI 23.86 kg/m²    Appearance alert, well appearing, and in no distress.  General exam BP noted to be well controlled today in office, S1, S2 normal, no gallop, no murmur, chest clear, no JVD, no HSM, no edema.   Lab review: labs are reviewed, up to date and normal.     Assessment:    Hypertension borderline controlled and needs improvement.     Plan:   Repeat labs ordered prior to next appointment.  Reviewed diet, exercise and weight control.  Recommended sodium restriction.    Through informed decision making process incorporating patients unique circumstances, the following treatment plan will be initiated:     For now take spironolactone 1/2 tablet in the morning with your lisinopril.   If your blood pressure is not at goal the increase spironolactone 25mg daily   Labs (chem6) in 2 weeks   Return for follow-up; in the interim, contact the office if new symptoms arise.  Dr. Byrd 4 weeks    Rea Ramos  MSN, APRN-CNP, PMHNP-BC  [facility]    Please excuse any errors in grammar or translation related to this dictation. Voice recognition software was utilized to prepare this document.    "

## 2024-07-25 NOTE — LETTER
July 25, 2024     Luis Escalera DO  [address]  [place] [address]    Patient: Laurel Sapp   YOB: 1975   Date of Visit: 7/25/2024       Dear Dr. Luis Escalera DO:    Thank you for referring Laurel Sapp to me for evaluation. Below are my notes for this consultation.  If you have questions, please do not hesitate to call me. I look forward to following your patient along with you.       Sincerely,     Rea Ramos, APRN-CNP      CC: No Recipients  ______________________________________________________________________________________    Subjective:   Laurel Sapp is a 48 y.o. female with hypertension.  Patient presents to the office today ambulatory with steady gait.  Last evaluated in clinic by myself 4 weeks ago.  At that time discontinue Cozaar due to lack of efficacy and resumed lisinopril 40 mg daily.  She called into the office with suboptimal blood pressure control and was initiated on spironolactone 12.5 mg daily.  Repeat potassium 4.1, sodium 142 and creatinine 0.79.    Patient presents today where she reports initially spironolactone was efficacious with blood pressures running in the 110s over 70s.  She has noted since Saturday her blood pressure has been elevated with systolics in the upper 140s and diastolic in the low 100s.  She is unable to elicit any type of inciting event.  Denies any over-the-counter medications, no alcohol, no increased caffeine, monitors her sodium.  She does have slight ear pain and perhaps early signs of sinus infection.  Otherwise blood pressure is elevated in the office today.  She is somewhat teary-eyed.    She is currently taking her lisinopril and Toprol at 7 AM.  Second dose of Toprol around 3 PM  Taking spironolactone at bedtime.    After discussion she has been encouraged to take spironolactone 12.5 mg in the morning along with her lisinopril.  She will continue to follow her blood pressure over  "the course of the week.  If it does not remain optimal then she will increase her spironolactone 25 mg daily with repeat labs in 2 weeks.    Current Outpatient Medications   Medication Sig Dispense Refill   • ergocalciferol (Vitamin D-2) 1.25 MG (68111 UT) capsule Take 1 capsule (1,250 mcg) by mouth every 7 days.     • lisinopril 40 mg tablet Take 1 tablet (40 mg) by mouth once daily. 30 tablet 11   • metoprolol succinate XL (Toprol-XL) 25 mg 24 hr tablet Take 1 tablet (25 mg) by mouth 2 times a day. Do not crush or chew. 180 tablet 3   • omega-3 acid ethyl esters (Lovaza) 1 gram capsule Take 1 capsule (1 g) by mouth once daily.     • omeprazole (PriLOSEC) 40 mg DR capsule Take 1 capsule (40 mg) by mouth once daily in the morning. Take before meals.     • spironolactone (Aldactone) 25 mg tablet Take 1 tablet (25 mg) by mouth once daily. 30 tablet 11     No current facility-administered medications for this visit.      Hypertension ROS: taking medications as instructed, no medication side effects noted, no TIA's, no chest pain on exertion, no dyspnea on exertion, and no swelling of ankles.   New concerns: Blood pressure remains slightly elevated, some noted improvement with spironolactone..     Objective:   BP (!) 152/96 (BP Location: Left arm, Patient Position: Sitting)   Pulse 72   Ht 1.626 m (5' 4\")   Wt 63 kg (139 lb)   BMI 23.86 kg/m²    Appearance alert, well appearing, and in no distress.  General exam BP noted to be well controlled today in office, S1, S2 normal, no gallop, no murmur, chest clear, no JVD, no HSM, no edema.   Lab review: labs are reviewed, up to date and normal.     Assessment:    Hypertension borderline controlled and needs improvement.     Plan:   Repeat labs ordered prior to next appointment.  Reviewed diet, exercise and weight control.  Recommended sodium restriction.    Through informed decision making process incorporating patients unique circumstances, the following treatment plan " will be initiated:     For now take spironolactone 1/2 tablet in the morning with your lisinopril.   If your blood pressure is not at goal the increase spironolactone 25mg daily   Labs (chem6) in 2 weeks   Return for follow-up; in the interim, contact the office if new symptoms arise.  Dr. Byrd 4 weeks    Rea Ramos  MSN, APRN-CNP, PMHNP-BC  [facility]    Please excuse any errors in grammar or translation related to this dictation. Voice recognition software was utilized to prepare this document.

## 2024-08-26 ENCOUNTER — APPOINTMENT (OUTPATIENT)
Dept: CARDIOLOGY | Facility: CLINIC | Age: 49
End: 2024-08-26
Payer: COMMERCIAL

## 2024-08-26 VITALS
HEIGHT: 64 IN | BODY MASS INDEX: 23.42 KG/M2 | HEART RATE: 84 BPM | SYSTOLIC BLOOD PRESSURE: 128 MMHG | DIASTOLIC BLOOD PRESSURE: 90 MMHG | WEIGHT: 137.2 LBS

## 2024-08-26 DIAGNOSIS — Z87.891 FORMER SMOKER: ICD-10-CM

## 2024-08-26 DIAGNOSIS — I10 PRIMARY HYPERTENSION: ICD-10-CM

## 2024-08-26 PROCEDURE — 1036F TOBACCO NON-USER: CPT | Performed by: INTERNAL MEDICINE

## 2024-08-26 PROCEDURE — 3080F DIAST BP >= 90 MM HG: CPT | Performed by: INTERNAL MEDICINE

## 2024-08-26 PROCEDURE — 3008F BODY MASS INDEX DOCD: CPT | Performed by: INTERNAL MEDICINE

## 2024-08-26 PROCEDURE — 99213 OFFICE O/P EST LOW 20 MIN: CPT | Performed by: INTERNAL MEDICINE

## 2024-08-26 PROCEDURE — 3074F SYST BP LT 130 MM HG: CPT | Performed by: INTERNAL MEDICINE

## 2024-08-26 RX ORDER — SPIRONOLACTONE 25 MG/1
25 TABLET ORAL DAILY
Qty: 90 TABLET | Refills: 1 | Status: SHIPPED | OUTPATIENT
Start: 2024-08-26 | End: 2025-08-26

## 2024-08-26 RX ORDER — LISINOPRIL 40 MG/1
40 TABLET ORAL DAILY
Qty: 90 TABLET | Refills: 1 | Status: SHIPPED | OUTPATIENT
Start: 2024-08-26 | End: 2025-08-26

## 2024-08-26 RX ORDER — METOPROLOL SUCCINATE 25 MG/1
25 TABLET, EXTENDED RELEASE ORAL 2 TIMES DAILY
Qty: 180 TABLET | Refills: 1 | Status: SHIPPED | OUTPATIENT
Start: 2024-08-26 | End: 2025-08-26

## 2024-08-26 NOTE — PROGRESS NOTES
"Most recently seen by me in April 2024, subsequently seen by Rea Ramos NP, I have reviewed Rea's notes.  She originally presented with palpitations dizziness loss of balance, multiple ER visits.  She has intolerance to several blood pressure medications.  She also has a tendency for hyponatremia and hypokalemia.    Subjective :     Reports feeling great after several months of struggling to come up with the correct medication regimen.  Frequently morning blood pressures are in the 120s, she has been splitting the lisinopril to 20 mg in the morning and 20 mg later in the day.  She has also been splitting the spironolactone 12.5 mg in the morning and then 12.5 mg a little later in the day.  This combination she says works very well.  She is very comfortable with the way she feels.  She is very appreciative of the medication changes that have been recently made by Rea Ramos NP.          Objective   Wt Readings from Last 3 Encounters:   08/26/24 62.2 kg (137 lb 3.2 oz)   07/25/24 63 kg (139 lb)   06/19/24 62.1 kg (137 lb)            Vitals:    08/26/24 1447 08/26/24 1450   BP: 136/90 128/90   BP Location: Left arm Right arm   Patient Position: Sitting Sitting   Pulse: 84    Weight: 62.2 kg (137 lb 3.2 oz)    Height: 1.626 m (5' 4\")                 Physical Exam:    GENERAL APPEARANCE: in no acute distress.  CHEST: Symmetric and non-tender.  INTEGUMENT: Skin warm and dry  HEENT: No gross abnormalities identified.No pallor or scleral icterus.  NECK: Supple, no JVD, no bruit.   NEURO/PSHCY: Alert and oriented x3; appropriate behavior and responses and responses  LUNGS: Clear to auscultation bilaterally; normal respiratory effort.  HEART: Rate and rhythm regular with no evident murmur; no gallop appreciated.   ABDOMEN: Soft, non tender.  MUSCULOSKELETAL: No gross deformities.  EXTREMITIES: Warm  There is no edema noted.    Meds:  Current Outpatient Medications   Medication Instructions    ergocalciferol (Vitamin " D-2) 1.25 MG (78785 UT) capsule 1 capsule, oral, Every 7 days    lisinopril 40 mg, oral, Daily    metoprolol succinate XL (TOPROL-XL) 25 mg, oral, 2 times daily, Do not crush or chew.    omega-3 acid ethyl esters (LOVAZA) 1 g, oral, Daily    omeprazole (PRILOSEC) 40 mg, oral, Daily before breakfast    spironolactone (ALDACTONE) 25 mg, oral, Daily          Allergies   Allergen Reactions    Amlodipine Palpitations    Chlorthalidone Palpitations and Dizziness    Amoxicillin Headache    Ciprofloxacin Headache    Sulfa (Sulfonamide Antibiotics) Swelling             LABS:    On 8/9/2024-sodium 139 potassium 4.4 GFR 86 glucose 75      Patient Active Problem List    Diagnosis Date Noted    Former smoker 04/15/2024    BMI 23.0-23.9, adult 04/15/2024    Palpitations 04/15/2024    Hypokalemia 04/15/2024    Hyponatremia 04/15/2024    Dizziness 04/15/2024    Primary hypertension 04/15/2024    Sinus tachycardia 04/15/2024                 Assessment:    1. Primary hypertension  Follow Up In Cardiology    Follow Up In Cardiology    Basic Metabolic Panel    Follow Up In Cardiology    lisinopril 40 mg tablet    metoprolol succinate XL (Toprol-XL) 25 mg 24 hr tablet    spironolactone (Aldactone) 25 mg tablet    Basic Metabolic Panel      2. BMI 23.0-23.9, adult        3. Former smoker           This is a patient with tendency for hyponatremia and hypokalemia, who we have struggled with for adequate blood pressure control without side effects.  Together, we have come up with a regimen that seems to be working.  She will continue the same regimen, and will follow-up with Rea Ramos NP in 6 months and with me in 1 year.  Prior to her next visit we will obtain basic metabolic profile.  Follow up : 6 months        Provider Attestation - Scribe documentation    All medical record entries made by the Scribe were at my direction and personally dictated by me. I have reviewed the chart and agree that the record accurately reflects my  personal performance of the history, physical exam, discussion and plan.

## 2024-08-26 NOTE — LETTER
"August 26, 2024     Luis Escalera DO  [address]  [facility] [address]    Patient: Laurel Sapp   YOB: 1975   Date of Visit: 8/26/2024       Dear Dr. Luis Escalera DO:    Thank you for referring Laurel Sapp to me for evaluation. Below are my notes for this consultation.  If you have questions, please do not hesitate to call me. I look forward to following your patient along with you.       Sincerely,     Janna Byrd MD      CC: No Recipients  ______________________________________________________________________________________    Most recently seen by me in April 2024, subsequently seen by Rea Ramos NP, I have reviewed Rea's notes.  She originally presented with palpitations dizziness loss of balance, multiple ER visits.  She has intolerance to several blood pressure medications.  She also has a tendency for hyponatremia and hypokalemia.    Subjective :     Reports feeling great after several months of struggling to come up with the correct medication regimen.  Frequently morning blood pressures are in the 120s, she has been splitting the lisinopril to 20 mg in the morning and 20 mg later in the day.  She has also been splitting the spironolactone 12.5 mg in the morning and then 12.5 mg a little later in the day.  This combination she says works very well.  She is very comfortable with the way she feels.  She is very appreciative of the medication changes that have been recently made by Rea Ramos NP.          Objective   Wt Readings from Last 3 Encounters:   08/26/24 62.2 kg (137 lb 3.2 oz)   07/25/24 63 kg (139 lb)   06/19/24 62.1 kg (137 lb)            Vitals:    08/26/24 1447 08/26/24 1450   BP: 136/90 128/90   BP Location: Left arm Right arm   Patient Position: Sitting Sitting   Pulse: 84    Weight: 62.2 kg (137 lb 3.2 oz)    Height: 1.626 m (5' 4\")                 Physical Exam:    GENERAL APPEARANCE: in no acute distress.  CHEST: Symmetric " and non-tender.  INTEGUMENT: Skin warm and dry  HEENT: No gross abnormalities identified.No pallor or scleral icterus.  NECK: Supple, no JVD, no bruit.   NEURO/PSHCY: Alert and oriented x3; appropriate behavior and responses and responses  LUNGS: Clear to auscultation bilaterally; normal respiratory effort.  HEART: Rate and rhythm regular with no evident murmur; no gallop appreciated.   ABDOMEN: Soft, non tender.  MUSCULOSKELETAL: No gross deformities.  EXTREMITIES: Warm  There is no edema noted.    Meds:  Current Outpatient Medications   Medication Instructions   • ergocalciferol (Vitamin D-2) 1.25 MG (94064 UT) capsule 1 capsule, oral, Every 7 days   • lisinopril 40 mg, oral, Daily   • metoprolol succinate XL (TOPROL-XL) 25 mg, oral, 2 times daily, Do not crush or chew.   • omega-3 acid ethyl esters (LOVAZA) 1 g, oral, Daily   • omeprazole (PRILOSEC) 40 mg, oral, Daily before breakfast   • spironolactone (ALDACTONE) 25 mg, oral, Daily          Allergies   Allergen Reactions   • Amlodipine Palpitations   • Chlorthalidone Palpitations and Dizziness   • Amoxicillin Headache   • Ciprofloxacin Headache   • Sulfa (Sulfonamide Antibiotics) Swelling             LABS:    On 8/9/2024-sodium 139 potassium 4.4 GFR 86 glucose 75      Patient Active Problem List    Diagnosis Date Noted   • Former smoker 04/15/2024   • BMI 23.0-23.9, adult 04/15/2024   • Palpitations 04/15/2024   • Hypokalemia 04/15/2024   • Hyponatremia 04/15/2024   • Dizziness 04/15/2024   • Primary hypertension 04/15/2024   • Sinus tachycardia 04/15/2024                 Assessment:    1. Primary hypertension  Follow Up In Cardiology    Follow Up In Cardiology    Basic Metabolic Panel    Follow Up In Cardiology    lisinopril 40 mg tablet    metoprolol succinate XL (Toprol-XL) 25 mg 24 hr tablet    spironolactone (Aldactone) 25 mg tablet    Basic Metabolic Panel      2. BMI 23.0-23.9, adult        3. Former smoker           This is a patient with tendency for  hyponatremia and hypokalemia, who we have struggled with for adequate blood pressure control without side effects.  Together, we have come up with a regimen that seems to be working.  She will continue the same regimen, and will follow-up with Rea Ramos NP in 6 months and with me in 1 year.  Prior to her next visit we will obtain basic metabolic profile.  Follow up : 6 months        Provider Attestation - Scribe documentation    All medical record entries made by the Scribe were at my direction and personally dictated by me. I have reviewed the chart and agree that the record accurately reflects my personal performance of the history, physical exam, discussion and plan.

## 2025-02-12 NOTE — ADDENDUM NOTE
Addended by: LEXX FONG on: 4/26/2024 03:12 PM     Modules accepted: Orders     PATIENT HAD BOTOX INJECTIONS AND NOW WAS SENT FOR SPEECH THERAPY  SPEECH THERAPIST IS ASKING FOR MODIFIED BA SWALLOW EVAL TO ASSESS IF STILL ASPIRATION OR NOT AND THIS IS PLACED

## 2025-02-25 ENCOUNTER — APPOINTMENT (OUTPATIENT)
Dept: CARDIOLOGY | Facility: CLINIC | Age: 50
End: 2025-02-25
Payer: COMMERCIAL

## 2025-03-09 DIAGNOSIS — I10 PRIMARY HYPERTENSION: ICD-10-CM

## 2025-03-11 ENCOUNTER — APPOINTMENT (OUTPATIENT)
Dept: CARDIOLOGY | Facility: CLINIC | Age: 50
End: 2025-03-11
Payer: COMMERCIAL

## 2025-03-11 VITALS
SYSTOLIC BLOOD PRESSURE: 130 MMHG | HEIGHT: 64 IN | DIASTOLIC BLOOD PRESSURE: 72 MMHG | WEIGHT: 146.6 LBS | BODY MASS INDEX: 25.03 KG/M2 | HEART RATE: 68 BPM

## 2025-03-11 DIAGNOSIS — R00.2 PALPITATIONS: ICD-10-CM

## 2025-03-11 DIAGNOSIS — I10 PRIMARY HYPERTENSION: ICD-10-CM

## 2025-03-11 PROCEDURE — 99213 OFFICE O/P EST LOW 20 MIN: CPT | Performed by: NURSE PRACTITIONER

## 2025-03-11 PROCEDURE — 3078F DIAST BP <80 MM HG: CPT | Performed by: NURSE PRACTITIONER

## 2025-03-11 PROCEDURE — 1036F TOBACCO NON-USER: CPT | Performed by: NURSE PRACTITIONER

## 2025-03-11 PROCEDURE — 3008F BODY MASS INDEX DOCD: CPT | Performed by: NURSE PRACTITIONER

## 2025-03-11 PROCEDURE — 3075F SYST BP GE 130 - 139MM HG: CPT | Performed by: NURSE PRACTITIONER

## 2025-03-11 RX ORDER — METOPROLOL SUCCINATE 25 MG/1
25 TABLET, EXTENDED RELEASE ORAL 2 TIMES DAILY
Qty: 180 TABLET | Refills: 1 | Status: SHIPPED | OUTPATIENT
Start: 2025-03-11

## 2025-03-11 RX ORDER — CHOLECALCIFEROL (VITAMIN D3) 25 MCG
1000 TABLET ORAL DAILY
COMMUNITY

## 2025-03-11 RX ORDER — LANOLIN ALCOHOL/MO/W.PET/CERES
1000 CREAM (GRAM) TOPICAL DAILY
COMMUNITY

## 2025-03-11 RX ORDER — BUTYROSPERMUM PARKII(SHEA BUTTER), SIMMONDSIA CHINENSIS (JOJOBA) SEED OIL, ALOE BARBADENSIS LEAF EXTRACT .01; 1; 3.5 G/100G; G/100G; G/100G
250 LIQUID TOPICAL DAILY
COMMUNITY

## 2025-03-11 RX ORDER — CALCIUM CARBONATE 300MG(750)
400 TABLET,CHEWABLE ORAL DAILY PRN
COMMUNITY

## 2025-03-11 ASSESSMENT — ENCOUNTER SYMPTOMS
NEAR-SYNCOPE: 0
ORTHOPNEA: 0
SYNCOPE: 0
DYSPNEA ON EXERTION: 0
IRREGULAR HEARTBEAT: 0
PND: 0
PALPITATIONS: 0

## 2025-03-11 NOTE — PROGRESS NOTES
"Chief Complaint  \"Doing really good\"    Reason for Visit  6 month follow up.  Patient presents to the office today for outpatient follow-up for hypertension  Last evaluated in clinic by Carson Delgado Aug 2024.     Presents today ambulatory with steady gait.  Accompanied by patient  Patient denies any hospitalizations or significant changes to interval medical history since last office follow-up.   She follows with PCP.  Labs Feb 2025 reviewed.    History of Present Illness   Patient is a pleasant 49-year-old female who presents to the office with no voiced cardiovascular complaints.  She continues to be very pleased with her hypertension control.  Follows her blood pressure regularly at home, had a wellness visit last week and her\" blood pressure was great\".  Here in the office is a little borderline but she did not have her medications this morning.  Otherwise she remains aerobically active denying any type of exertional complaints.    February 2024 potassium 4.4, creatinine 0.81.    Adhering to 2017 AHA/ACC Guideline for the Prevention, Detection, Evaluation, and Management of High Blood Pressure in Adults:   - Encouraged primary lifestyle modifications including consumption of healthy diet, reduced sodium intake, moderation in alcohol intake, weight loss and increased physical activity.    Patient reports that overall has no complaint(s) of chest pain, chest pressure/discomfort, claudication, dyspnea, exertional chest pressure/discomfort, fatigue, and irregular heart beat    Daily activity:    > 4 METs  Denies any change in exercise capacity or functional tolerance since last office visit.      Review of Systems   Cardiovascular:  Negative for chest pain, dyspnea on exertion, irregular heartbeat, leg swelling, near-syncope, orthopnea, palpitations, paroxysmal nocturnal dyspnea and syncope.        Visit Vitals  /72 (BP Location: Right arm, Patient Position: Sitting)   Pulse 68   Ht 1.626 m (5' 4\")   Wt 66.5 kg " (146 lb 9.6 oz)   BMI 25.16 kg/m²   Smoking Status Former   BSA 1.73 m²     Physical Exam  Vitals and nursing note reviewed.   HENT:      Head: Normocephalic.   Cardiovascular:      Rate and Rhythm: Normal rate and regular rhythm.      Heart sounds: Normal heart sounds.   Pulmonary:      Effort: Pulmonary effort is normal.      Breath sounds: Normal breath sounds.   Abdominal:      Palpations: Abdomen is soft.   Musculoskeletal:      Right lower leg: No edema.      Left lower leg: No edema.   Skin:     General: Skin is warm and dry.   Neurological:      General: No focal deficit present.      Mental Status: She is alert.   Psychiatric:         Mood and Affect: Mood normal.         Behavior: Behavior normal.      Allergies   Allergen Reactions    Amlodipine Palpitations    Chlorthalidone Palpitations and Dizziness    Amoxicillin Headache    Ciprofloxacin Headache    Sulfa (Sulfonamide Antibiotics) Swelling     Current Outpatient Medications   Medication Instructions    cholecalciferol (VITAMIN D3) 1,000 Units, Daily    cyanocobalamin (VITAMIN B-12) 1,000 mcg, Daily    lisinopril 40 mg, oral, Daily    magnesium oxide (MAG-OX) 400 mg, Daily PRN    metoprolol succinate XL (TOPROL-XL) 25 mg, oral, 2 times daily    omega-3 acid ethyl esters (LOVAZA) 1 g, Daily    omeprazole (PRILOSEC) 40 mg, Daily before breakfast    saccharomyces boulardii (FLORASTOR) 250 mg, Daily    spironolactone (ALDACTONE) 25 mg, oral, Daily      Assessment:    BMI 25.0-25.9,adult  Relates to 'snacking'  No fluid retention    Primary hypertension  optimal in office      Palpitations  Remains quiescent on toprol    Plan:     Through informed decision making process incorporating patients unique circumstances, the following treatment plan will be initiated:    1.  Prescription drug management of cardiovascular medication for efficacy, adherence to treatment, side effect assessment and polypharmacy. Current treatment clinically warranted and to  continue without modifications.    2.  Return for follow-up; in the interim, contact the office if new symptoms arise.  Dr. yBrd as scheduled with labs prior    Rea Ramos MSN, APRN-CNP, PMHNP-[facility] [place], [place]     Please excuse any errors in grammar or translation related to this dictation. Voice recognition software was utilized to prepare this document.

## 2025-03-11 NOTE — LETTER
"March 11, 2025     Luis Escalera DO  [address]  [facility] [address]    Patient: Laurel Sapp   YOB: 1975   Date of Visit: 3/11/2025       Dear Dr. Luis Escalera DO:    Thank you for referring Laurel Sapp to me for evaluation. Below are my notes for this consultation.  If you have questions, please do not hesitate to call me. I look forward to following your patient along with you.       Sincerely,     Rea Ramos, APRN-CNP      CC: No Recipients  ______________________________________________________________________________________    Chief Complaint  \"Doing really good\"    Reason for Visit  6 month follow up.  Patient presents to the office today for outpatient follow-up for hypertension  Last evaluated in clinic by Carson Delgado Aug 2024.     Presents today ambulatory with steady gait.  Accompanied by patient  Patient denies any hospitalizations or significant changes to interval medical history since last office follow-up.   She follows with PCP.  Labs Feb 2025 reviewed.    History of Present Illness   Patient is a pleasant 49-year-old female who presents to the office with no voiced cardiovascular complaints.  She continues to be very pleased with her hypertension control.  Follows her blood pressure regularly at home, had a wellness visit last week and her\" blood pressure was great\".  Here in the office is a little borderline but she did not have her medications this morning.  Otherwise she remains aerobically active denying any type of exertional complaints.    February 2024 potassium 4.4, creatinine 0.81.    Adhering to 2017 AHA/ACC Guideline for the Prevention, Detection, Evaluation, and Management of High Blood Pressure in Adults:   - Encouraged primary lifestyle modifications including consumption of healthy diet, reduced sodium intake, moderation in alcohol intake, weight loss and increased physical activity.    Patient reports that overall has " "no complaint(s) of chest pain, chest pressure/discomfort, claudication, dyspnea, exertional chest pressure/discomfort, fatigue, and irregular heart beat    Daily activity:    > 4 METs  Denies any change in exercise capacity or functional tolerance since last office visit.      Review of Systems   Cardiovascular:  Negative for chest pain, dyspnea on exertion, irregular heartbeat, leg swelling, near-syncope, orthopnea, palpitations, paroxysmal nocturnal dyspnea and syncope.        Visit Vitals  /72 (BP Location: Right arm, Patient Position: Sitting)   Pulse 68   Ht 1.626 m (5' 4\")   Wt 66.5 kg (146 lb 9.6 oz)   BMI 25.16 kg/m²   Smoking Status Former   BSA 1.73 m²     Physical Exam  Vitals and nursing note reviewed.   HENT:      Head: Normocephalic.   Cardiovascular:      Rate and Rhythm: Normal rate and regular rhythm.      Heart sounds: Normal heart sounds.   Pulmonary:      Effort: Pulmonary effort is normal.      Breath sounds: Normal breath sounds.   Abdominal:      Palpations: Abdomen is soft.   Musculoskeletal:      Right lower leg: No edema.      Left lower leg: No edema.   Skin:     General: Skin is warm and dry.   Neurological:      General: No focal deficit present.      Mental Status: She is alert.   Psychiatric:         Mood and Affect: Mood normal.         Behavior: Behavior normal.      Allergies   Allergen Reactions   • Amlodipine Palpitations   • Chlorthalidone Palpitations and Dizziness   • Amoxicillin Headache   • Ciprofloxacin Headache   • Sulfa (Sulfonamide Antibiotics) Swelling     Current Outpatient Medications   Medication Instructions   • cholecalciferol (VITAMIN D3) 1,000 Units, Daily   • cyanocobalamin (VITAMIN B-12) 1,000 mcg, Daily   • lisinopril 40 mg, oral, Daily   • magnesium oxide (MAG-OX) 400 mg, Daily PRN   • metoprolol succinate XL (TOPROL-XL) 25 mg, oral, 2 times daily   • omega-3 acid ethyl esters (LOVAZA) 1 g, Daily   • omeprazole (PRILOSEC) 40 mg, Daily before breakfast "   • saccharomyces boulardii (FLORASTOR) 250 mg, Daily   • spironolactone (ALDACTONE) 25 mg, oral, Daily      Assessment:    BMI 25.0-25.9,adult  Relates to 'snacking'  No fluid retention    Primary hypertension  optimal in office      Palpitations  Remains quiescent on toprol    Plan:     Through informed decision making process incorporating patients unique circumstances, the following treatment plan will be initiated:    1.  Prescription drug management of cardiovascular medication for efficacy, adherence to treatment, side effect assessment and polypharmacy. Current treatment clinically warranted and to continue without modifications.    2.  Return for follow-up; in the interim, contact the office if new symptoms arise.  Dr. Byrd as scheduled with labs prior    Rea Ramos MSN, APRN-CNP, PMHNP-[facility] [place]  [place], [place]     Please excuse any errors in grammar or translation related to this dictation. Voice recognition software was utilized to prepare this document.

## 2025-03-11 NOTE — PATIENT INSTRUCTIONS
Please bring all medicines, vitamins, and herbal supplements with you when you come to the office.    Prescriptions will not be filled unless you are compliant with your follow up appointments or have a follow up appointment scheduled as per instruction of your physician. Refills should be requested at the time of your visit.     PLAN:   Through informed decision making process incorporating patients unique circumstances, the following treatment plan will be initiated:    1.  Prescription drug management of cardiovascular medication for efficacy, adherence to treatment, side effect assessment and polypharmacy. Current treatment clinically warranted and to continue without modifications.    2.  Return for follow-up; in the interim, contact the office if new symptoms arise.  Dr. Byrd as scheduled with labs prior

## 2025-08-05 ENCOUNTER — TELEPHONE (OUTPATIENT)
Dept: CARDIOLOGY | Facility: CLINIC | Age: 50
End: 2025-08-05
Payer: COMMERCIAL

## 2025-08-05 NOTE — TELEPHONE ENCOUNTER
"Call from patient stating she went to [facility] ER two days ago, with c/o elevated BP.  States labs were \"ok\", but BP was still high.  Home BP's range between 145/96 - 160/96, and \"calf muscles are really tight\".      To Rea Monet to please obtain [facility] reports and testing results from 08.03.25, and send to Dr. Janna Byrd MD to review.     To Dr. Janna Byrd MD    To [facility] for follow up.  "

## 2025-08-25 ENCOUNTER — APPOINTMENT (OUTPATIENT)
Dept: CARDIOLOGY | Facility: CLINIC | Age: 50
End: 2025-08-25
Payer: COMMERCIAL

## 2025-08-25 VITALS
SYSTOLIC BLOOD PRESSURE: 148 MMHG | DIASTOLIC BLOOD PRESSURE: 100 MMHG | WEIGHT: 149.8 LBS | BODY MASS INDEX: 25.57 KG/M2 | HEART RATE: 68 BPM | HEIGHT: 64 IN

## 2025-08-25 DIAGNOSIS — M79.10 MYALGIA: ICD-10-CM

## 2025-08-25 DIAGNOSIS — Z79.899 MEDICATION COURSE CHANGED: ICD-10-CM

## 2025-08-25 DIAGNOSIS — Z87.891 FORMER SMOKER: ICD-10-CM

## 2025-08-25 DIAGNOSIS — I10 PRIMARY HYPERTENSION: ICD-10-CM

## 2025-08-25 PROCEDURE — 3077F SYST BP >= 140 MM HG: CPT | Performed by: INTERNAL MEDICINE

## 2025-08-25 PROCEDURE — 3008F BODY MASS INDEX DOCD: CPT | Performed by: INTERNAL MEDICINE

## 2025-08-25 PROCEDURE — 3080F DIAST BP >= 90 MM HG: CPT | Performed by: INTERNAL MEDICINE

## 2025-08-25 PROCEDURE — 99214 OFFICE O/P EST MOD 30 MIN: CPT | Performed by: INTERNAL MEDICINE

## 2025-08-25 RX ORDER — LISINOPRIL 40 MG/1
40 TABLET ORAL DAILY
Qty: 90 TABLET | Refills: 3 | Status: SHIPPED | OUTPATIENT
Start: 2025-08-25 | End: 2026-08-25

## 2025-08-25 RX ORDER — SPIRONOLACTONE 25 MG/1
25 TABLET ORAL 2 TIMES DAILY
Qty: 180 TABLET | Refills: 3 | Status: SHIPPED | OUTPATIENT
Start: 2025-08-25 | End: 2026-08-25

## 2025-08-25 RX ORDER — CETIRIZINE HYDROCHLORIDE 10 MG/1
10 TABLET ORAL AS NEEDED
COMMUNITY

## 2025-08-25 RX ORDER — METOPROLOL SUCCINATE 25 MG/1
25 TABLET, EXTENDED RELEASE ORAL 2 TIMES DAILY
Qty: 180 TABLET | Refills: 3 | Status: SHIPPED | OUTPATIENT
Start: 2025-08-25 | End: 2026-08-25

## 2025-08-25 RX ORDER — IBUPROFEN 200 MG
200 TABLET ORAL EVERY 6 HOURS
COMMUNITY

## 2025-09-24 ENCOUNTER — APPOINTMENT (OUTPATIENT)
Dept: CARDIOLOGY | Facility: CLINIC | Age: 50
End: 2025-09-24
Payer: COMMERCIAL